# Patient Record
Sex: MALE
[De-identification: names, ages, dates, MRNs, and addresses within clinical notes are randomized per-mention and may not be internally consistent; named-entity substitution may affect disease eponyms.]

---
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
## 2020-01-10 ENCOUNTER — RX ONLY (RX ONLY)
Age: 20
End: 2020-01-10

## 2020-01-13 ENCOUNTER — RX ONLY (RX ONLY)
Age: 20
End: 2020-01-13

## 2020-02-10 ENCOUNTER — RX ONLY (RX ONLY)
Age: 20
End: 2020-02-10

## 2020-06-18 ENCOUNTER — RX ONLY (RX ONLY)
Age: 20
End: 2020-06-18

## 2020-07-20 ENCOUNTER — RX ONLY (RX ONLY)
Age: 20
End: 2020-07-20

## 2020-08-28 ENCOUNTER — RX ONLY (RX ONLY)
Age: 20
End: 2020-08-28

## 2021-08-31 ENCOUNTER — PROCEDURE VISIT (OUTPATIENT)
Dept: SPORTS MEDICINE | Age: 21
End: 2021-08-31

## 2021-08-31 DIAGNOSIS — M75.41 IMPINGEMENT SYNDROME OF RIGHT SHOULDER: Primary | ICD-10-CM

## 2021-08-31 NOTE — PROGRESS NOTES
Athletic Training  Date of Report: 2021  Name: David Nuñez  School: [employer]  Sport: Cheerleading  : 2000  Age: 24 y.o. MRN: [id number]  Encounter:  [x] New AT Eval     [] Follow-Up Visit    [] Other:   SUBJECTIVE:  Reason for Visit:    Chief Complaint   Patient presents with    Shoulder Pain     David Nuñez is a 24y.o. year old, male who presents today for evaluation of athletic injury involving right shoulder. 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 David Nuñez report they are right hand dominate. Onset of the injury began suddenly, starting about 4 months ago and injury occurred during training. Current pain and symptoms include: pinching, sharp, stabbing and weakness. Current level of pain is a 3. Symptoms have been intermittent and worsening since that time. Symptoms improve with rest and medication: anti-inflammatories. Symptoms worsen with participating in sports: certain lifts in cheerleading, lifting your arm overhead and reaching behind your back. The shoulder has dislocated or felt out of place. Shoulder has not felt numb and/or lost sensation. Associated sounds or feelings at time of injury included: none. Treatment to date has included: medication: anti-inflammatories and rest. Treatment has been somewhat helpful. Previous history includes: None. Ciarra presents today with R shoulder pain. Pain began in 2021 during training. He took time off during the summer but pain did not fully subside. Saw a KIMBERLY. O. at home who ordered an MRI. MRI results showed a microtear in his subscapularis and an undrainable pocket of fluid around his rotator cuff. JASBIRO prescribed anti-inflammatories and wrote a rx for PT. The antiinflammatories helped but the pain never fully went away.  Now that he is off the anti-inflammatories he feels his shoulder is worsening with practice and he cannot get extra practice in because he knows he needs Maretta Mighty Position []  []   Scapular Rotation []  []   Shoulder Elevation []  []    []  []    []  []   Orthopaedic Exam: Right Shoulder  Palpation:   Tenderness: [] None  [] Mild [x] Moderate [] Severe   at: Humeral Head, Pectoralis Major, Infraspinatus and Subscapularis  Crepitation: [x] None  [] Mild [] Moderate [] Severe   at: R Shoulder  Effusion: [x] None  [] Mild [] Moderate [] Severe   at: R Shoulder  Brachial Pulse:  [] Not assessed [] Not Detected [] Detected  Radial Pulse:  [] Not assessed [] Not Detected [] Detected  Deformity:   Range of Motion: (Not assessed if not marked)  [] Normal Flexibility / Mobility   ROM WNL PROM AROM OP Comments     L R L R L R    Flexion  [x]       Pain in posterior shoulder at end range   Extension [x]       Pain in posterior shoulder at end range   Abduction [x]       Pain in posterior shoulder at end range   Adduction [x]          Horizontal Adduction [x]       Pain in posterior shoulder at end range   Horizontal Abduction [x]          ER [x]       Pain in posterior shoulder at end range   IR [x]       Pain in posterior shoulder at end range   90/90 ER [x]          90/90 IR [x]           []           []          Manual Muscle Test: (Not assessed if not marked)  [] Normal Strength  MMT Left Right Comment   GH Flexion 5 5 Mild pain   GH extension 5 5 Mild pain   GH Abduction 5 5    GH IR 5 5    GH ER 5 5    90/90 GH IR 5 5    90/90 GH ER 5 5 pain   Scapular Retraction      Scapular Protraction      Scapular Elevation      Scapular Depression                  Provocative Tests: (Not tested if not marked)   Negative Positive Positive Findings   Labral Pathology      Load Shift [x] []    Jerk Test [] []    Grind [x] []    Clunk [] []    Crank [x] []    [place] Test [] []    Impingement      Neer's [] [x] Pain in posterior shoulder   Alexander-Liang [] [x] Pain in posterior shoulder   Post. Impingement [] []    Impingement reduction [] []    SC / AC joint      Crossover ADD [] []    AC compression [] []    AC distraction [] []    SC stress [] []    Piano Key [x] []    RTC       Empty Can [x] []    Drop Arm [] []    Apley's Scratch [] []    Painful Arc [] []    Biceps Pathology      Speed's [x] []    Yergason's  [x] []    [employer]'s Test [] []    Stability      Push Pull [] []    Ant. Apprehension [x] []    Dali Relocation [] []    Surprise Release  [] []    Sulcus [x] []    Anterior Glide [x] []    Posterior Glide [x] []    Thoracic Outlet Syndrome      Adson's [x] []    Geraldo's [] []     Brace [] []    Stephie's Test [] []    Miscellaneous      Lift-Off test [] [x] Weakness and pain    [] []    Reflex / Motor Function:    Gross motor weakness of shoulder:  [x] None [] Mild  [] Moderate [] Severe  Notes:   Gross motor weakness of elbow:  [x] None [] Mild  [] Moderate [] Severe  Notes:   Gross motor weakness of wrist:  [x] None [] Mild  [] Moderate [] Severe  Notes:   Gross motor weakness of hand:  [x] None [] Mild  [] Moderate [] Severe  Notes:    Sensory / Neurologic Function:  [x] Sensation to light touch intact    [] Impaired:   [x] Deep tendon reflexes intact    [] Impaired:   [x] Coordination / proprioception intact  [] Impaired:   Contralateral Shoulder:  [x] Normal ROM and function with no pain. ASSESSMENT:   Diagnosis Orders   1. Impingement syndrome of right shoulder       Clinical Impression: R Shoulder Impingement and microtear of subscapularis  Status: No Participation  Est. Time Missed: >1 Week  PLAN:  Treatment:  [x] Rest  [] Ice   [] Wrap  [] Elevate  [] Tape  [] First Aid/Wound [] Moist Heat  [] Crutches  [] Brace  [] Splint  [] Sling  [] Immobilizer   [] Whirlpool  [] Massage  [] Pneumatic  [x] Rehab/Exercise  [] Other:   Guardian Contacted: No  Comments / Instructions: Began rehab exercises and treatment today.  Spoke with Everette about taking a few weeks off of training or limiting training to calm down his shoulder while we try to make strides in pain reduction, strength, and stability with therapy. He is on board with the plan. Will speak with  tomorrow and see if he is okay with limited participation (group lifts) or if he would prefer he be out completely. Deep's goal is to be back to training in a month when preparation for Nationals begins. He never went to PT despite having a referral so I have set him up at [address] [place] [address] as well. If no progress is being made he and I will discuss having him see our orthopedic surgeon and see what other treatment options are available. Follow-Up Care / Instructions:  and Physical Therapy  HEP Information: Continue rehab in [place] and at [place]. Continue to ice.    Discharged: Yes  Electronically Signed By: Mayra Clayton ATC, ANDREW, ATC

## 2021-08-31 NOTE — PROGRESS NOTES
Robin Ramos is a 24 y.o. male patient. Subjective Everette came in today to begin treatment & rehab for his R shoulder. Everette reports that he woke up last night at 4am with pinching pain in his posterior R shoulder and that he feels his shoulder is worsening after starting up training and being off the anti-inflammatories. Objective     Assessment & Plan R shoulder impingement, microtear of subscapularis, pocket of fluid around RTC    Today Everette did:   Light shoulder stretch  Prone scap squeezes 2x10  Prone IYT w/ scap squeezes [id number]  Green TB Scaption 2x10  Green TB Abducation 2x10  Serratus punches w/ ball 2x15  Rhythmic stabilization w/ ball [id number]  IFC & ice 15'    Everette will come in tomorrow to continue treatment & rehab. Will speak with his  tomorrow about Deep's injury and participation status tomorrow night.  Everette also has a PT appt set up with [facility] for Thursday at 1:15pm.     Olga Hilton ATC  8/31/2021

## 2021-09-01 ENCOUNTER — PROCEDURE VISIT (OUTPATIENT)
Dept: SPORTS MEDICINE | Age: 21
End: 2021-09-01

## 2021-09-01 DIAGNOSIS — M75.41 IMPINGEMENT SYNDROME OF RIGHT SHOULDER: Primary | ICD-10-CM

## 2021-09-01 NOTE — PROGRESS NOTES
Diane Askew is a 24 y.o. male patient. Subjective Everette came in today for therapeutic exercises for his R Shoulder. He reports that his shoulder is feeling a bit tired and achy from the rehab we did yesterday. Objective  Assessment & Plan R Shoulder Impingement and Subscapularis microtear    Today Everette did:   Light shoulder stretch  Prone scap squeezes 2x10  Prone IYT w/ scap squeezes [id number]  ER/IR w/ green TB 2x10   Green TB D1 pattern x10  Serratus punches w/ ball 2x20  Rhythmic stabilization w/ ball [id number]  Prone horizontal ball drops 2x10  IFC & ice 20'    Everette reported his shoulder was very fatigued and felt weak today and he was a bit frustrated that he cannot do everything he wants to do. He has his first PT appt tomorrow and will come in to the [address] [place] [address] for more rehab on Fri around 2pm. Will talk with  tonight about the possibility of only having him participate in group lifts and not partner lifts. Limited participation at this time.     Merrick Burns ATC  9/1/2021

## 2021-09-02 ENCOUNTER — OFFICE VISIT (OUTPATIENT)
Dept: PRIMARY CARE CLINIC | Age: 21
End: 2021-09-02
Payer: COMMERCIAL

## 2021-09-02 ENCOUNTER — PROCEDURE VISIT (OUTPATIENT)
Dept: SPORTS MEDICINE | Age: 21
End: 2021-09-02

## 2021-09-02 ENCOUNTER — HOSPITAL ENCOUNTER (OUTPATIENT)
Dept: PHYSICAL THERAPY | Age: 21
Setting detail: THERAPIES SERIES
Discharge: HOME | End: 2021-09-02
Payer: COMMERCIAL

## 2021-09-02 VITALS
BODY MASS INDEX: 26.73 KG/M2 | HEART RATE: 67 BPM | HEIGHT: 75 IN | WEIGHT: 215 LBS | DIASTOLIC BLOOD PRESSURE: 74 MMHG | SYSTOLIC BLOOD PRESSURE: 133 MMHG | OXYGEN SATURATION: 100 %

## 2021-09-02 DIAGNOSIS — S06.0X0A CONCUSSION WITHOUT LOSS OF CONSCIOUSNESS, INITIAL ENCOUNTER: Primary | ICD-10-CM

## 2021-09-02 DIAGNOSIS — G44.319 ACUTE POST-TRAUMATIC HEADACHE, NOT INTRACTABLE: Primary | ICD-10-CM

## 2021-09-02 PROCEDURE — 99204 OFFICE O/P NEW MOD 45 MIN: CPT | Performed by: STUDENT IN AN ORGANIZED HEALTH CARE EDUCATION/TRAINING PROGRAM

## 2021-09-02 NOTE — FLOWSHEET NOTE
[facility]    Physical Therapy  Cancellation/No-show Note  Patient Name:  Xander Glover  :  2000   Date:  2021  Cancelled visits to date: 1  No-shows to date: 0    For today's appointment patient:  [x]  Cancelled  []  Rescheduled appointment  []  No-show     Reason given by patient:  []  Patient ill  []  Conflicting appointment  []  No transportation    []  Conflict with work  []  No reason given  [x]  Other:     Comments:  Due to insurance    Electronically signed by:  Laury Marin, PT, DPT

## 2021-09-02 NOTE — PROGRESS NOTES
Dipesh Campa is a 24 y.o. male patient. Liliana Gaviria came in today and reported that he took an elbow to the face from a teammate last night when catching a basket. He reported that he immediately felt like his head from vibrating after he was hit. His lip is also mildly swollen and bruised from where he took the hit. His teeth are aching and it hurts to bite down but none are loose. He woke up with a mild headache this morning but that dissipated without the use of medication. He also reports feeling foggy and having pressure in the back of his head.      Objective     SIGNS &  SYMPTOMS DATE    9/2         Physical Headache 0          Pressure in head 1          Neck Pain 0          Nausea or vomiting 0          Dizziness 0          Blurred Vision 0          Balance problems 0          Sensitivity to light  0          Sensitivity to noise  0         Thinking Feeling slowed down 0          In a Fog Feeling 1          Dont feel right 0          Difficulty concentrating 0          Difficulty remembering 0         Sleep Fatigue or low energy 0          Confusion 0          Drowsiness 0          Trouble falling asleep 0         Emotional More emotional 0          Irritability 0          Sadness 0          Nervous or Anxious 0            Total Number of Symptoms 2         Symptom severity score 2             Vestibular/Ocular-Motor Screening (VOMS) for Concussion    Vestibular/Ocular Motor Test:   Not  Tested   Headache  0-­?10   Dizziness  0-­?10   Nausea  0-­?10   Fogginess  0-­?10   Comments     BASELINE N/A          Smooth Pursuits    0 0 0 0    Saccades - Horizontal    0 0 0 0    Saccades - Vertical    0 0 0 0 Did note that he felt he didn't have his full field of vision   Convergence ([place] 1)        (Near [place] in cm):  Measure 1: _6cm____  Measure 2: _5cm____  Measure 3: __6cm___   VOR - Horizontal    0 0 0 0    VOR - Vertical  0 0 0 0 Bothered his eyes   Visual Motion Sensitivity Test    0 0 0 0 reported Eyes got blurry     ImPACT Composite Scores    Exam Type Baseline  Post-Injury 1     Date Tested  9/2     Memory Composite (verbal)  68     Memory Composite (visual)  73     Visual Motor Speed Composite  42.63     Reaction Time Composite  0.60     Impulse Control Composite  3     Total Symptom Score  5         Assessment & Plan Mild symptoms and had not taken a baseline concussion test yet so I have nothing to compare his ImPACT composite scores to. Possible concussion. Referred to Dr. Reese Rocha for further evaluation and possible academic accommodations if needed. If diagnosed with a concussion will check in daily for symptom evaluation and to begin return to play process.      Magdalene [place] ATC  9/2/2021

## 2021-09-02 NOTE — PROGRESS NOTES
Chief Complaint   Patient presents with    Concussion     Evaluate for possible concussion, elbowed head last night during practice. HPI:      Everette is a 24 y.o. male who presents for evaluation of possible concussion. 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 and while performing a catch yesterday was elbowed in the mouth. He had a busted lip and then had a little bit of neck soreness last night. He performed impact test today with equivocal results and unfortunately does not have a baseline study. He had a headache this morning lasted for about half an hour but will wait on its own without medications. The neck soreness he had last night is also gone. He does not feel getting difficulty studying class today and is also played Candy crush for hours at a time without symptoms. He denies light sensitivity, sound sensitivity, difficulty with vision or difficulty with balance. Overall he feels like he is at his baseline. History reviewed. No pertinent past medical history. Medication  No current outpatient medications on file. No current facility-administered medications for this visit. Allergies  No Known Allergies    Review of Systems:  Pertinent items are noted in HPI. Physical Examination: This is a pleasant male, alert, and in no acute distress. Vitals:    09/02/21 1518   BP: 133/74   Site: Left Upper Arm   Position: Sitting   Cuff Size: Small Adult   Pulse: 67   SpO2: 100%   Weight: 215 lb (97.5 kg)   Height: 6' 3\" (1.905 m)       VOMS: Negative    JAQUELINE: B: 0, S: 4, T: 0 Total: 4    Cervical exam: Full range of motion, no tenderness to palpation    Vascular exam: Extremities warm and well perfused, no significant edema    Respiratory exam: Breathing easy and unlabored    Neuro exam: No focal neuro deficits    Lymphatic: No obvious lymphadenopathy    Skin: Warm, dry    Radiology:     None. Impact results were reviewed without baseline results available.   Results were within

## 2021-09-03 ENCOUNTER — PROCEDURE VISIT (OUTPATIENT)
Dept: SPORTS MEDICINE | Age: 21
End: 2021-09-03

## 2021-09-03 DIAGNOSIS — M75.41 IMPINGEMENT SYNDROME OF RIGHT SHOULDER: Primary | ICD-10-CM

## 2021-09-03 NOTE — PROGRESS NOTES
Subjective:      Patient ID: Mynor Peña is a 24 y.o. {race/ethnicity:80483} male. Chief Complaint   Patient presents with    Shoulder Pain     {HPI:99689}  {Also Blocks:79008::\" \"}     Social History     Occupational History    Not on file   Tobacco Use    Smoking status: Not on file   Substance and Sexual Activity    Alcohol use: Not on file    Drug use: Not on file    Sexual activity: Not on file      @ROS@    Objective:   Ortho Exam      Assessment:     1.  Impingement syndrome of right shoulder      ***    Plan:     ***

## 2021-09-03 NOTE — PROGRESS NOTES
David Nuñez is a 24 y.o. male patient. No current outpatient medications on file. No current facility-administered medications for this visit. No Known Allergies  Active Problems:    * No active hospital problems. *  Resolved Problems:    * No resolved hospital problems. *    There were no vitals taken for this visit. Subjective: Pt. Reported to the [address] [place] [address] on 9/3/2021.    Objective  Assessment & Plan    Mary Ferguson, PT  9/3/2021

## 2021-09-03 NOTE — PROGRESS NOTES
Subjective:      Patient ID: Kenna Ulloa is a 24 y.o. male.     HPI    Review of Systems    Objective:   Physical Exam    Assessment:      ***      Plan:      ***        Linda Overall, PT

## 2021-09-03 NOTE — PROGRESS NOTES
Diane Askew is a 24 y.o. male patient. Subjective Pt. Came into the [address] [place] [address] on 9/3/2021 for rehabilitation on R shoulder for R Shoulder Impingement and Subscapularis microtear. Pt. Had first PT session yesterday 9/2/2021 and was cleared by Dr. Georges Overall on 9/2/2021 from a concussion. Objective: Exercises include:    Light stretching  Internal rotation 0 degrees with band 2 x 10  External rotation at 0 degrees with band 2 x 10  Scap punches 2 x 10  Scap squeezes 2 x 10  Farmer's with green TB and foam roller 2 x 15. Rhythmic stabilization 3 x 30 sec  Scap clocks x15  Ice and stim 20 minutes      Assessment & Plan R Shoulder Impingement and Subscapularis microtear. Return on Tuesday 9/7/2021. Felt good during the exercises but was tiring.      Marlon Magana ATC, LAT  9/3/2021

## 2021-09-07 ENCOUNTER — PROCEDURE VISIT (OUTPATIENT)
Dept: SPORTS MEDICINE | Age: 21
End: 2021-09-07

## 2021-09-07 DIAGNOSIS — M75.41 IMPINGEMENT SYNDROME OF RIGHT SHOULDER: Primary | ICD-10-CM

## 2021-09-07 NOTE — PROGRESS NOTES
Subjective:      Patient ID: Garett Hermosillo is a 24 y.o. male. Everette comes in today for therapeutic exercises on his R shoulder. Reports his shoulder did not bother him when cheering at the game on Saturday, only mild pain afterwards. Also, reports that he feels his shoulder is getting stronger. Objective:   Ortho Exam      Assessment:     1. Impingement syndrome of right shoulder        Plan: Today Everette did:   Light stretching  Internal rotation 0 degrees with band 2 x 10  External rotation at 0 degrees with band 2 x 10  D1 pattern w/ band 2x10  Scap punches w/ 4# 3 x 10  Scap squeezes 2 x 10  Prone IYT w/ scap squeeze [id number]  Rhythmic stabilization 3 x 30 sec  Bodyblade ER @ 0 degrees [id number]  Ice and IFC 20 minutes    Everette tolerated all exercises well. Appears to be gaining some ROM in external rotation but still struggling with internal rotation. Will come in tomorrow for more rehab. Continue limiting partner work.

## 2021-09-08 ENCOUNTER — PROCEDURE VISIT (OUTPATIENT)
Dept: SPORTS MEDICINE | Age: 21
End: 2021-09-08

## 2021-09-08 DIAGNOSIS — M75.41 IMPINGEMENT SYNDROME OF RIGHT SHOULDER: Primary | ICD-10-CM

## 2021-09-08 NOTE — PROGRESS NOTES
Subjective:      Patient ID: Eyad Cox is a 24 y.o. male. Everette comes in today for therapeutic exercises for his R shoulder. He reports his shoulder feels a bit sore after yesterday's exercises. Objective:   Ortho Exam      Assessment:     1. Impingement syndrome of right shoulder      Plan: Today Everette did:  Light stretching  Scap squeezes 2 x 10  Prone IYT w/ scap squeeze [id number]  Scaption with band 3 x 10  Abduction with band 3 x 10  No monies with band 2x12  ER iso hold with blue TB band x10  Rhythmic stabilization 3 x 30 sec  Bodyblade ER @ 0 degrees [id number]  Ice and IFC 20 minutes    Everette tolerated all exercises well. Continue rehab tomorrow. Start progressing exercises futher from 0 degrees and begin building strength when in an overhead position. Continue to limit partner stunting at this time, full group stunting as tolerated.

## 2021-09-09 ENCOUNTER — PROCEDURE VISIT (OUTPATIENT)
Dept: SPORTS MEDICINE | Age: 21
End: 2021-09-09

## 2021-09-09 DIAGNOSIS — M75.41 IMPINGEMENT SYNDROME OF RIGHT SHOULDER: Primary | ICD-10-CM

## 2021-09-09 NOTE — PROGRESS NOTES
Subjective:      Patient ID: David Nuñez is a 24 y.o. male. Everette comes in today for therapeutic exercises for his R shoulder. He reports his shoulder is continuing to feel good. 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 Objective:   Ortho Exam      Assessment:     1. Impingement syndrome of right shoulder      Plan: Today Everette did:   Light stretching  Prone scap retraction + ER + OH press [id number]  IR at 0 degrees with green band 3x10  ER at 0 degrees with green band 3x10  ER iso hold with blue TB band 2x10  Serratus punches 4# 2x20  Rhythmic stabilization w/ 4# 3 x 30 sec  Bodyblade flexion @ 90 degrees [id number]  Ice and IFC 15 minutes    Everette did well with all exercises today. Continue rehab tomorrow. Continue to progress OH activity as tolerated and build muscular endurance. Continue to limit partner stunting in practice.

## 2021-09-13 ENCOUNTER — PROCEDURE VISIT (OUTPATIENT)
Dept: SPORTS MEDICINE | Age: 21
End: 2021-09-13

## 2021-09-13 DIAGNOSIS — M75.41 IMPINGEMENT SYNDROME OF RIGHT SHOULDER: Primary | ICD-10-CM

## 2021-09-13 NOTE — PROGRESS NOTES
Subjective:      Patient ID: Leane Gaucher is a 24 y.o. male. Everette reports his shoulder is feeling good overall. He will occasionally get a random pinching sensation but that subsides quickly. Has not stunted or lifted in over a week so he believes that his shoulder has calmed down due to that. Does note that his shoulder is feeling stronger and more stable as we continue rehab. Objective:   Ortho Exam    Assessment:     1. Impingement syndrome of right shoulder      Plan: Today Everette did:   Light stretching  Prone scap retraction + ER + OH press [id number]  Scaption with green band 2x20  Abduction with green band 2x20  D1 with green band 2x20  ER iso hold with blue TB band [id number]  Rhythmic stabilization w/ 4# 3 x 30 sec  Bodyblade flexion @ 90 degrees [id number]  Ice and IFC 15 minutes     Everette did well with all exercises today. Continue rehab tomorrow. Continue to progress OH activity as tolerated and build muscular endurance. Continue to limit partner stunting in practice.

## 2021-09-14 ENCOUNTER — PROCEDURE VISIT (OUTPATIENT)
Dept: SPORTS MEDICINE | Age: 21
End: 2021-09-14

## 2021-09-14 DIAGNOSIS — M75.41 IMPINGEMENT SYNDROME OF RIGHT SHOULDER: Primary | ICD-10-CM

## 2021-09-14 NOTE — PROGRESS NOTES
Subjective:      Patient ID: Ana Oliveira is a 24 y.o. male. Everette comes in today for therapeutic exercises for his R shoulder. He says his shoulder felt really good at practice last night when stunting and is still feeling good today. 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 Objective:   Ortho Exam    Assessment:     1. Impingement syndrome of right shoulder      Plan: Today Everette did:   Shoulder stretch  Prone scap retraction + ER + OH press [id number]  IR at 0 degrees with green band 3x10  ER at 0 degrees with green band 3x10  ER iso hold with blue TB band 2x10  Prone ball drops abduction at 90 and ER [id number]  Windshield wipers w/ green TB 3x6  Rhythmic stabilization w/ 4# 3 x 30 sec  Bodyblade IR/ER @ 0 degrees [id number]  Ice and IFC 15 minutes    Everette tolerated all exercises well. Continue rehab and limiting practice as needed.

## 2021-09-15 ENCOUNTER — PROCEDURE VISIT (OUTPATIENT)
Dept: SPORTS MEDICINE | Age: 21
End: 2021-09-15

## 2021-09-15 DIAGNOSIS — M75.41 IMPINGEMENT SYNDROME OF RIGHT SHOULDER: Primary | ICD-10-CM

## 2021-09-15 NOTE — PROGRESS NOTES
Subjective:      Patient ID: Virginia Nielsen is a 24 y.o. male. Everette comes in today for therapeutic exercises for his R shoulder. He reports his shoulder is feeling really good and he feels he is making progress in rehab. He did some extra stunting last night to practice some new tricks and he stated he did not have to take a pain reliever before or after that extra activity. Objective:   Ortho Exam      Assessment:     1. Impingement syndrome of right shoulder      Plan: Today Everette did:   Shoulder stretch  Prone scap retraction + ER + OH press [id number]  Standing I,Y,T with blue band [id number]  Prone swimmers 2x12  ER iso hold with blue TB band 2x10  Windshield wipers w/ green TB 3x6  Rhythmic stabilization w/ 4# 3 x 30 sec  Bodyblade ABD @ 90 degrees [id number]  IFC 15 minutes    Everette tolerated all exercises well. Was able to progress some exercises today and he had no pain with any new exercises. Continue to progress OH rehab and increase resistance as tolerated. Continue rehab and limiting practice as needed.

## 2021-09-16 ENCOUNTER — PROCEDURE VISIT (OUTPATIENT)
Dept: SPORTS MEDICINE | Age: 21
End: 2021-09-16

## 2021-09-16 DIAGNOSIS — M75.41 IMPINGEMENT SYNDROME OF RIGHT SHOULDER: Primary | ICD-10-CM

## 2021-09-16 NOTE — PROGRESS NOTES
Subjective:      Patient ID: Amy Montalvo is a 24 y.o. male. Everette comes in today for therapeutic exercises for his R shoulder. He reports his shoulder is continuing to feel good at practice. Objective:   Ortho Exam    Assessment:     1. Impingement syndrome of right shoulder        Plan: Today Everette did:   Shoulder stretch  Prone scap retraction + ER + OH press [id number]  IR @ 90 degrees with blue band 2x10  ER @ 90 degrees with blue band 2x10   Prone swimmers 2x12  Clock taps w/ green TB 2x8  Rhythmic stabilization flex @ 45 degrees w/ 4# 3x30 sec  Bodyblade flex @ 90 degrees [id number]  IFC and ice 15 minutes     Everette tolerated all exercises well. Was able to progress some exercises today and he had no pain with any new exercises. Continue to progress OH rehab and increase resistance as tolerated. Continue rehab and limiting practice as needed.

## 2021-09-20 ENCOUNTER — PROCEDURE VISIT (OUTPATIENT)
Dept: SPORTS MEDICINE | Age: 21
End: 2021-09-20

## 2021-09-20 DIAGNOSIS — M75.41 IMPINGEMENT SYNDROME OF RIGHT SHOULDER: Primary | ICD-10-CM

## 2021-09-20 NOTE — PROGRESS NOTES
Subjective:      Patient ID: Woodford Denver is a 24 y.o. male. Everette comes in today for therapeutic exercises for his R shoulder. He reports his shoulder is feeling decent after cheering at the football game over the weekend. He also reports he has been trying to stay away from any [place] activities that could aggravate his shoulder and that is helping. Objective:   Ortho Exam    R shoulder ER ROM is improving. Assessment:     1. Impingement syndrome of right shoulder      Plan: Today Everette did:   Shoulder stretch  Prone scap retraction + ER + OH press 2x15  IR @ 90 degrees with blue band 2x10  ER @ 90 degrees with blue band 2x10   Windshield wipers w/ green TB 3x8  Clock taps w/ blue TB 3x6  Bodyblade flex @ 90 degrees [id number]  Ball drops ER @ 90 4x10  IFC and ice 15 minutes     Everette tolerated all exercises well. No pain with any exercises. Continue to rehab.

## 2021-09-21 ENCOUNTER — PROCEDURE VISIT (OUTPATIENT)
Dept: SPORTS MEDICINE | Age: 21
End: 2021-09-21

## 2021-09-21 DIAGNOSIS — M75.41 IMPINGEMENT SYNDROME OF RIGHT SHOULDER: Primary | ICD-10-CM

## 2021-09-21 NOTE — PROGRESS NOTES
Subjective:      Patient ID: Anne Price is a 24 y.o. male. Everette comes in today for therapeutic exercises for his R shoulder. He did not have practice last night so his shoulder is feeling pretty good. He states that his shoulder feels significantly better than when he started rehab 3 weeks ago. He still gets some pain with a high volume of activity but it does not linger or inhibit his ADL's. Objective:   Ortho Exam    Assessment:     1. Impingement syndrome of right shoulder      Plan: Today Everette did:   Shoulder stretch  Prone scap retraction + ER + OH press 2x15  IR @ 90 degrees with blue band 2x10  ER @ 90 degrees & OH press with blue band 2x10   Windshield wipers w/ blue TB 3x8  No monies w/ blue TB 3x6  Rhythmic stabilization flex @ 45 degrees [id number]  Bodyblade ER @ 90 degrees [id number]  IFC and ice 10 minutes     Everette tolerated all exercises well. Continue to progress exercises and increase strength and ROM. Continue rehab.

## 2021-09-22 ENCOUNTER — PROCEDURE VISIT (OUTPATIENT)
Dept: SPORTS MEDICINE | Age: 21
End: 2021-09-22

## 2021-09-22 DIAGNOSIS — M75.41 IMPINGEMENT SYNDROME OF RIGHT SHOULDER: Primary | ICD-10-CM

## 2021-09-22 NOTE — PROGRESS NOTES
Subjective:      Patient ID: Myrna Rondon is a 21 y.o.male. Everette comes in today for therapeutic exercises for his R shoulder. 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 Objective:   Ortho Exam      Assessment:     1. Impingement syndrome of right shoulder        Plan: Today Everette did:   Shoulder stretch  Prone scap retraction + ER + OH press 2x15  Prone swimmers 2x12  IR @ 90 degrees with blue band 2x10  ER @ 90 degrees & OH press with blue band 2x10  D1 pattern with green TB 2x10   Windshield wipers w/ blue TB 3x8  KB hold carry 13# 5x  Bodyblade flexion @ 90 degrees [id number]  IFC and ice 10 minutes      Everette tolerated all exercises well and continues to progress. Continue rehab.

## 2021-09-23 ENCOUNTER — PROCEDURE VISIT (OUTPATIENT)
Dept: SPORTS MEDICINE | Age: 21
End: 2021-09-23

## 2021-09-23 DIAGNOSIS — M75.41 IMPINGEMENT SYNDROME OF RIGHT SHOULDER: Primary | ICD-10-CM

## 2021-09-23 NOTE — PROGRESS NOTES
Subjective:      Patient ID: [place] is a 24 y.o. male. Everette comes in today for therapeutic exercises for his R shoulder. He reports shoulder is feeling good after practice last night. They have begun to start preparing for [employer] so practice is going to get more intense. The team is not traveling to [place] so he will have a weekend off from [place]. Objective:   Ortho Exam      Assessment:     1. Impingement syndrome of right shoulder        Plan: Today Everette did:   Shoulder stretch  Prone scap retraction + ER + OH press 2x15  Prone IYT with 2#  IR @ 90 degrees with blue band 2x10  ER @ 90 degrees & OH press with blue band 2x10  [facility] wip[facility] w/ blue TB 3x8  Rhythmic stabilization [id number]  KB hold carry 13# 5x  Bodyblade ER @ 90 degrees [id number]  IFC and ice 10 minutes    Everette tolerated all exercises well. Continue to progress exercises as [employer] practice starts to get underway.

## 2021-09-27 ENCOUNTER — PROCEDURE VISIT (OUTPATIENT)
Dept: SPORTS MEDICINE | Age: 21
End: 2021-09-27

## 2021-09-27 DIAGNOSIS — M75.41 IMPINGEMENT SYNDROME OF RIGHT SHOULDER: Primary | ICD-10-CM

## 2021-09-27 NOTE — PROGRESS NOTES
Subjective:      Patient ID: Rogelio Mahmood is a 24 y.o. male. Nava Matthew came in today for therapeutic exercises for his R shoulder. He reports his shoulder is feeling fine as he has not cheered in a few days. Does report some R deltoid soreness but no pain. Objective:   Ortho Exam      Assessment:     1. Impingement syndrome of right shoulder      Plan: Today Nava Matthew did:   Shoulder stretch  Prone scap retraction + ER + OH press 2# x15  Prone IYT with 2# x15 each  IR @ 90 degrees with blue band 2x10  ER @ 90 degrees & OH press with blue band 2x10  [employer] wipers w/ blue TB 3x8  Rhythmic stabilization 5# [id number]  KB hold carry 13# 6x  Bodyblade ER @ 0 degrees [id number]  IFC and ice 10 minutes    Nava Matthew did well with all his exercises. Progressed weight on prone exercises and plan to progress to using black TB for banded exercises. Continue to rehab.

## 2021-09-28 ENCOUNTER — PROCEDURE VISIT (OUTPATIENT)
Dept: SPORTS MEDICINE | Age: 21
End: 2021-09-28

## 2021-09-28 DIAGNOSIS — M75.41 IMPINGEMENT SYNDROME OF RIGHT SHOULDER: Primary | ICD-10-CM

## 2021-09-28 NOTE — PROGRESS NOTES
Subjective:      Patient ID: Mynor Peña is a 24 y.o. male. Eveertte comes in today for therapeutic exercises for his R shoulder. He reports his shoulder is feeling good after practice last night. Objective:   Ortho Exam      Assessment:     1. Impingement syndrome of right shoulder      Plan: Today Everette did:  Shoulder stretch  Prone swimmers 2x12  FR snow angels 2x20  Weighted IYT 2# 2x10 each  [place] clock taps with black TB 3x5  ER @ 90 degrees & OH press with blue band & 2# 2x10  Windshield wipers w/ black TB 3x8  KB hold carry 13# 5x  Bodyblade flex @ 90 degrees [id number]  IFC and ice 10 minutes     Everette tolerated all exercises well. Continue to progress exercise difficulty as tolerated. [employer] practices are starting to begin so need to maintain shoulder strength and increase endurance. Continue rehab.

## 2021-09-29 ENCOUNTER — PROCEDURE VISIT (OUTPATIENT)
Dept: SPORTS MEDICINE | Age: 21
End: 2021-09-29

## 2021-09-29 DIAGNOSIS — M75.41 IMPINGEMENT SYNDROME OF RIGHT SHOULDER: Primary | ICD-10-CM

## 2021-09-29 DIAGNOSIS — S06.0X0A CONCUSSION WITHOUT LOSS OF CONSCIOUSNESS, INITIAL ENCOUNTER: Primary | ICD-10-CM
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
## 2021-09-30 ENCOUNTER — PROCEDURE VISIT (OUTPATIENT)
Dept: SPORTS MEDICINE | Age: 21
End: 2021-09-30

## 2021-09-30 ENCOUNTER — OFFICE VISIT (OUTPATIENT)
Dept: ORTHOPEDIC SURGERY | Age: 21
End: 2021-09-30
Payer: COMMERCIAL

## 2021-09-30 VITALS — HEIGHT: 75 IN | WEIGHT: 215 LBS | BODY MASS INDEX: 26.73 KG/M2

## 2021-09-30 DIAGNOSIS — S06.0X0A CONCUSSION WITHOUT LOSS OF CONSCIOUSNESS, INITIAL ENCOUNTER: Primary | ICD-10-CM

## 2021-09-30 PROCEDURE — 99214 OFFICE O/P EST MOD 30 MIN: CPT | Performed by: STUDENT IN AN ORGANIZED HEALTH CARE EDUCATION/TRAINING PROGRAM
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
## 2021-09-30 NOTE — PATIENT INSTRUCTIONS
Patient Education        Concussion: Care Instructions  Your Care Instructions     A concussion is a kind of injury to the brain. It happens when the head receives a hard blow. The impact can jar or shake the brain against the skull. This interrupts the brain's normal activities. Although you may have cuts or bruises on your head or face, you may have no other visible signs of a brain injury. In most cases, damage to the brain from a concussion can't be seen in tests such as a CT or MRI scan. For a few weeks, you may have low energy, dizziness, trouble sleeping, a headache, ringing in your ears, or nausea. You may also feel anxious, grumpy, or depressed. You may have problems with memory and concentration. These symptoms are common after a concussion. They should slowly improve over time. Sometimes this takes weeks or even months. Someone who lives with you should know how to care for you. Please share this and all information with a caregiver who will be available to help if needed. Follow-up care is a key part of your treatment and safety. Be sure to make and go to all appointments, and call your doctor if you are having problems. It's also a good idea to know your test results and keep a list of the medicines you take. How can you care for yourself at home? Pain control  · Put ice or a cold pack on the part of your head that hurts for 10 to 20 minutes at a time. Put a thin cloth between the ice and your skin. · Be safe with medicines. Read and follow all instructions on the label. ? If the doctor gave you a prescription medicine for pain, take it as prescribed. ? If you are not taking a prescription pain medicine, ask your doctor if you can take an over-the-counter medicine. Recovery  · Follow your doctor's instructions. He or she will tell you if you need someone to watch you closely for the next 24 hours or longer. · Rest is the best way to recover from a concussion.  You need to rest your body and athlete to join in normal game play. Watch and keep track of your progress. It should take at least 6 days for you to go from light activity to normal game play. Make sure that you can stay at each new level of activity for at least 24 hours without symptoms, or as long as your doctor says, before doing more. If one or more symptoms come back, return to a lower level of activity for at least 24 hours. Don't move on until all symptoms are gone. When should you call for help? Call 911 anytime you think you may need emergency care. For example, call if:    · You have a seizure.     · You passed out (lost consciousness).     · You are confused or can't stay awake. Call your doctor now or seek immediate medical care if:    · You have new or worse vomiting.     · You feel less alert.     · You have new weakness or numbness in any part of your body. Watch closely for changes in your health, and be sure to contact your doctor if:    · You do not get better as expected.     · You have new symptoms, such as headaches, trouble concentrating, or changes in mood. Where can you learn more? Go to https://[employer]. org and sign in to your [employer] account. Enter V090 in the [employer] box to learn more about \"Concussion: Care Instructions. \"     If you do not have an account, please click on the \"Sign Up Now\" link. Current as of: April 8, 2021               Content Version: 13.0  © 3660-1346 Healthwise, [facility]. Care instructions adapted under license by [facility] ([facility]). If you have questions about a medical condition or this instruction, always ask your healthcare professional. [place] [address] any warranty or liability for your use of this information.
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
## 2021-09-30 NOTE — PROGRESS NOTES
Chief Complaint   Patient presents with   [facility], [place]. Luis Felipeadekathy fell and hit top of his head with her elbow. Last night around 9:00. Headache, lack of focus, blurry peripheral vision, headache when using computers. HPI:    Everette is a 24y.o. year old male who presents for a evaluation of possible concussion. Date of head injury: 9/29/21  Mechanism of injury: elbow to head while catching flyer at cheer practice last night  Since that time the patient has noted: Headache, dizziness, fatigue, trouble falling asleep, drowsiness, light sensitivity, noise sensitivity, irritability, nervousness, feeling slowed down, difficulty concentrating, difficulty remembering, visual symptoms. No LOC. Current school attendance: college student. Short-term concentration is okay, but not doing well with long term memory. Headache has been bothering him. Do the symptoms get worse with mental activity: yes  Do the symptoms get worse with physical activity: no     Post-Concussion Symptom Inventory (PCSI): 19    Previous Concussion History    Number of previous concussions: 1 possible     Relevant Medical History  History reviewed. No pertinent past medical history. No current outpatient medications on file. No current facility-administered medications for this visit. History reviewed. No pertinent surgical history. Baseline neurocognitive testing performed: no    Review of Systems  As above.      Physical Examination:    Vitals:    09/30/21 1330   Weight: 215 lb (97.5 kg)   Height: 6' 3\" (1.905 m)       Constitutional: Alert, no distress, answers questions appropriately  Neuro: CN II - XII intact  Strength 5/5 in bilateral upper extremities  Strength 5/5 in bilateral lower extremities  Moves all extremities equally  Normal sensation in bilateral upper and lower extremities  Symmetric reflexes bilateral upper and lower extremities  Head: Normal cephalic, no point tenderness to palpation  No sinus tenderness  Ears: Hearing grossly intact  Eyes: Extra ocular movements intact  Normal conjugate gaze  Normal tracking  No nystagmus  Normal finger to nose  Point of near convergence: 12cm  Nasal: No tenderness, or bruising  Balance: Normal single leg stance with eyes open  Negative Romberg  Negative Pronator drift  Normal tandem gait with eyes open  Vascular: Symmetric pulses in bilateral upper and lower extremities    C-spine Examination  ROM  Flexion: Normal  Extension: Normal  Lateral rotation to right: Normal  Lateral rotation to left: Normal  Lateral bend to right: Normal  Lateral bend to left: Normal  Pain to palpation c-spine: Normal  Pain in periscapular region: Negative  Shoulder shrug: Normal  Spurling's maneuver right: Negative  Spurling's maneuver left: Negative    Balance Testing  Double Leg Stance (feet together): 0 errors  Single leg stance (non-dominant foot): 4 errors  Tandem stance (non-dominant foot at back): 0 errors    Balance examination score (30-errors): 4 errors    VOMS: caused symptoms, dizziness        Neurocognitive Testing    ImPACT test results: worse than baseline - scanned into media tab      Assessment:  Everette is a 24 y.o. male who presents for evaluation of concussion. On examination, he has JAQUELINE testing that illustrates 4 errors, near point of convergence is abnormal, and he has + VOMS, along with symptoms. This is consistent with concussion. Plan:    Pathophysiology of concussion was discussed with the patient, and the role of cognitive and physical rest was discussed. Plan of care was discussed with his  with his permission. She will check in on him on a daily basis to see how symptoms are doing we will progress him as indicated. Once he is getting close to clearance we will have him come back in for an appointment. He may follow-up sooner if there are other issues or worsening symptoms. Eye Exercises:    1.  Pencil pushups two minutes twice daily. Headache Management:  Goal is to use little or no medications for headache. If necessary Kelsy Mcdonald may use tylenol or ibuprofen at onset of headache, but recommend no more than three times per week. Maintain adequate hydration-8 cups of water per day, avoid caffeine or caffeine products  Sunglasses are acceptable to be used indoors and outdoors if light hurts eyes  Ear plugs can be of assistance if loud noise bothers your head  Good sleep hygiene:  Consistent sleep schedule - bedtime should remain relatively consistent during your concussion recovery. Low lighting in the evening is encouraged to promote ease of going to sleep  Melatonin 1-3 mg as needed for sleep, take one to two hours before bedtime    School:  Full days as tolerated  Advise at least one 10 min break, per class period while still with headaches. Extended time to complete tests and assignments (this includes homework assignments). This adjustment should remain so long as symptoms are present. Excusing non-essential assignments and weight remaining assignments/exams accordingly. he will not perform at his baseline during the period which he is symptomatic. Further assingnments and coursework during this time tends to exacerbate symptoms. Waterbottle in class  Ok to wear sunglasses in class. Ok to walk in gym class or do exercise bike. No contact sports until cleared. Screen time:  Texting on the phone is acceptable. Turn down brightness on sc  Use bigger fonts on electronic devices. Advise no screen time 1 hour prior to bedtime. Activity:  Daily walks encouraged  Kelsy Mcdonald is encouraged to do light aerobic exercise such as walking. May increase volume, as tolerated. No contact sports until cleared    On this date, 9/30/2021 I have spent greater than 40 minutes reviewing previous notes, test results, and coordinating care as well as documenting and discussing the plan of care with the patient.      Lemuel Mahan Ny Mao MD

## 2021-09-30 NOTE — PROGRESS NOTES
Subjective:      Patient ID: Xander Glover is a 24 y.o. male. Everette came in today for his symptom check-up and to take his post-injury ImPACT test but he is not acting entirely like his usual self. His chief complaint is having an intermittent headache. His professor did excuse him from his exam this morning. Objective:   Ortho Exam     SIGNS &  SYMPTOMS DATE     9/30/2021        Physical Headache  2         Pressure in head  2         Neck Pain  1         Nausea or vomiting  0         Dizziness  1         Blurred Vision  3         Balance problems  0         Sensitivity to light   4         Sensitivity to noise   2        Thinking Feeling slowed down  4         In a Fog Feeling  3         Dont feel right  3         Difficulty concentrating  4         Difficulty remembering  4        Sleep Fatigue or low energy  1         Confusion  2         Drowsiness  1         Trouble falling asleep  1        Emotional More emotional  0         Irritability  1         Sadness  0         Nervous or Anxious  1           Total Number of Symptoms  18        Symptom severity score  40            ImPACT Composite Scores    Exam Type Baseline  Post-Injury 1     Date Tested  9/30/2021     Memory Composite (verbal)  81     Memory Composite (visual)  73     Visual Motor Speed Composite  40.17     Reaction Time Composite  0.73     Impulse Control Composite  3     Total Symptom Score  19       Assessment:     1. Concussion without loss of consciousness, initial encounter      Plan:     Everette is seeing Dr. Amy Eddy at 1:30 today for further evaluation and to receive academic accommodations. He is on mental and physical rest until his symptoms are reduced and he can begin a return to learn/play. Will monitor symptoms daily and have him retake ImPACT when his symptom score is lowered.

## 2021-09-30 NOTE — LETTER
[address]  [address] [address]  Phone: [phone]  Fax: [phone]    Breanna Amin MD        September 30, 2021     Patient: Marcin Valdovinos   YOB: 2000   Date of Visit: 9/30/2021       To Whom it May Concern:    Marcin Valdovinos was seen in my clinic on 9/30/2021 for a concussion. Please use him from class or any exams which he had today. Please also allow him some extra time for his assignments for the next few weeks until he has been cleared by me. If you have any questions or concerns, please don't hesitate to call.     Sincerely,         Breanna Amin MD

## 2021-10-04 ENCOUNTER — PROCEDURE VISIT (OUTPATIENT)
Dept: SPORTS MEDICINE | Age: 21
End: 2021-10-04

## 2021-10-04 DIAGNOSIS — S06.0X0A CONCUSSION WITHOUT LOSS OF CONSCIOUSNESS, INITIAL ENCOUNTER: Primary | ICD-10-CM

## 2021-10-04 NOTE — PROGRESS NOTES
Subjective:      Patient ID: Elizabeth Ferrer is a 24 y.o. male. Everette comes in today for a symptom check-up for his concussion. He reports feeling good overall with only mild lingering symptoms. He submitted his academic accommodations but they have not been approved so he has contacted his professors and is completing assignments as he is able. Objective:   Ortho Exam      SIGNS &  SYMPTOMS DATE     9/30/2021 10/4/2021       Physical Headache  2 0        Pressure in head  2 0        Neck Pain  1 0        Nausea or vomiting  0 0        Dizziness  1 0        Blurred Vision  3 0        Balance problems  0 0        Sensitivity to light   4 0        Sensitivity to noise   2 0       Thinking Feeling slowed down  4 0        In a Fog Feeling  3 1        Dont feel right  3 0        Difficulty concentrating  4 1        Difficulty remembering  4 0       Sleep Fatigue or low energy  1 0        Confusion  2 0        Drowsiness  1 0        Trouble falling asleep  1 0       Emotional More emotional  0 0        Irritability  1 0        Sadness  0 0        Nervous or Anxious  1 0          Total Number of Symptoms  18 2       Symptom severity score  40 2           Assessment:     1. Concussion without loss of consciousness, initial encounter      Plan:     If Everette comes in tomorrow with better or similar symptoms I am going to have him retake his post-injury ImPACT test. If his scores improve I will start him on stage 1 of return to play and send his scores to Dr. Iza Camarena. Will follow-up with Dr. Iza Camarena on Thursday if he is progressing as expected. Continue physical rest at this time. Encouraged him to continue to take breaks when studying and completing schoolwork.

## 2021-10-05 ENCOUNTER — PROCEDURE VISIT (OUTPATIENT)
Dept: SPORTS MEDICINE | Age: 21
End: 2021-10-05

## 2021-10-05 DIAGNOSIS — S06.0X0D CONCUSSION WITHOUT LOSS OF CONSCIOUSNESS, SUBSEQUENT ENCOUNTER: Primary | ICD-10-CM

## 2021-10-05 DIAGNOSIS — M75.41 IMPINGEMENT SYNDROME OF RIGHT SHOULDER: ICD-10-CM

## 2021-10-05 NOTE — PROGRESS NOTES
Subjective:      Patient ID: Valerio Bautista is a 24 y.o. male. Everette comes in today for a symptom check-up for his concussion and to retake his post-injury ImPACT Test. He is having no symptoms currently and feels he is back to 100%. He took the exam that he had to reschedule from last week and did well on that. He is not traveling to [facility] for the football game this weekend. Objective:   Ortho Exam      SIGNS &  SYMPTOMS DATE     9/30/2021 10/4/2021 10/5/2021      Physical Headache  2 0 0       Pressure in head  2 0 0       Neck Pain  1 0 0       Nausea or vomiting  0 0 0       Dizziness  1 0 0       Blurred Vision  3 0 0       Balance problems  0 0 0       Sensitivity to light   4 0 0       Sensitivity to noise   2 0 0      Thinking Feeling slowed down  4 0 0       In a Fog Feeling  3 1 0       Dont feel right  3 0 0       Difficulty concentrating  4 1 0       Difficulty remembering  4 0 0      Sleep Fatigue or low energy  1 0 0       Confusion  2 0 0       Drowsiness  1 0 0       Trouble falling asleep  1 0 0      Emotional More emotional  0 0 0       Irritability  1 0 0       Sadness  0 0 0       Nervous or Anxious  1 0 0         Total Number of Symptoms  18 2 0      Symptom severity score  40 2 0        ImPACT Composite Scores    Exam Type Baseline  Post-Injury 1 Post-Injury 2    Date Tested N/A 9/30/2021 10/5/2021    Memory Composite (verbal)  81 96    Memory Composite (visual)  73 89    Visual Motor Speed Composite  40.17 47.70    Reaction Time Composite  0.73 0.55    Impulse Control Composite  3 5    Total Symptom Score  19 0        Assessment:     1. Concussion without loss of consciousness, subsequent encounter      Plan:     Peng Garsiaw his post-injury ImPACT Test and his scores have improved. Will send his results to Dr. Patricia Mina and get approval for him to begin his return to play progression.      Today Everette did:   Shoulder stretch  Prone swimmers 2x12  Scap retract + ER + OH press blue TB 2x10  ER ball drops 2x15  Rhythmic stabilization 5# [id number]  BB ABD @ 90 3x35 sec    Everette did well with all his shoulder exercises. Continue rehab for his shoulder.

## 2021-10-06 ENCOUNTER — PROCEDURE VISIT (OUTPATIENT)
Dept: SPORTS MEDICINE | Age: 21
End: 2021-10-06

## 2021-10-06 DIAGNOSIS — M75.41 IMPINGEMENT SYNDROME OF RIGHT SHOULDER: ICD-10-CM

## 2021-10-06 DIAGNOSIS — S06.0X0D CONCUSSION WITHOUT LOSS OF CONSCIOUSNESS, SUBSEQUENT ENCOUNTER: Primary | ICD-10-CM

## 2021-10-06 NOTE — PROGRESS NOTES
Subjective:      Patient ID: David Davidson is a 24 y.o. male. Everette comes in today to complete his shoulder exercises and to begin his first step in the return to play concussion protocol. He reports his shoulder is feeling good - likely due to the lack of activity. He reports no concussion symptoms and academic activity continues to go well. Objective:   Ortho Exam      Assessment:     1. Concussion without loss of consciousness, subsequent encounter    2. Impingement syndrome of right shoulder      Plan: Today Everette did:  Shoulder stretch  Prone scap retract+ER+OH press 2# 2x10  Weighted IYT 2# 2x10 each  Rhythmic stabilization 5# [id number]  ER @ 90 degrees & OH press with blue band 3x10  IR @ 90 degrees with blue band 3x10  KB hold in full flexion 13# 5x  Bodyblade flex @ 90 degrees [id number]  Rowed at level 4 for 7 minutes     Everette rowed at a moderate resistance for 7 minutes today. He had no symptoms before, during, or after rowing. Will progress to the next step either tonight or tomorrow. Everette also did well with all his shoulder exercises. Continue to rehab.

## 2021-10-11 ENCOUNTER — PROCEDURE VISIT (OUTPATIENT)
Dept: SPORTS MEDICINE | Age: 21
End: 2021-10-11

## 2021-10-11 DIAGNOSIS — S06.0X0D CONCUSSION WITHOUT LOSS OF CONSCIOUSNESS, SUBSEQUENT ENCOUNTER: Primary | ICD-10-CM

## 2021-10-12 NOTE — PROGRESS NOTES
Subjective:      Patient ID: Po Acevedo is a 24 y.o. male. Maria Guadalupe Yang completed his non-contact step of the concussion return to play protocol over the weekend. He did a full workout and did not have any symptoms return. He has practice tonight and is looking to participate in full stunts. Objective:   Ortho Exam      Assessment:     1. Concussion without loss of consciousness, subsequent encounter      Plan:     Spoke with Dr. Kenisha Waters on the phone and he has cleared Maria Guadalupe Yang to participate in full cheer practice tonight. This is the last step in his return to play concussion protocol. Will follow-up with him after practice to ensure he had no symptoms return. Will follow-up with Dr. Kenisha Waters if needed or if any symptoms return.

## 2021-10-15 ENCOUNTER — RX ONLY (RX ONLY)
Age: 21
End: 2021-10-15

## 2021-11-16 ENCOUNTER — RX ONLY (RX ONLY)
Age: 21
End: 2021-11-16

## 2021-12-17 ENCOUNTER — RX ONLY (RX ONLY)
Age: 21
End: 2021-12-17